# Patient Record
Sex: MALE | Race: WHITE | NOT HISPANIC OR LATINO
[De-identification: names, ages, dates, MRNs, and addresses within clinical notes are randomized per-mention and may not be internally consistent; named-entity substitution may affect disease eponyms.]

---

## 2019-04-30 PROBLEM — Z00.00 ENCOUNTER FOR PREVENTIVE HEALTH EXAMINATION: Status: ACTIVE | Noted: 2019-04-30

## 2019-05-06 ENCOUNTER — APPOINTMENT (OUTPATIENT)
Dept: OTOLARYNGOLOGY | Facility: CLINIC | Age: 24
End: 2019-05-06
Payer: MEDICAID

## 2019-05-06 VITALS
OXYGEN SATURATION: 96 % | WEIGHT: 145 LBS | SYSTOLIC BLOOD PRESSURE: 140 MMHG | BODY MASS INDEX: 22.76 KG/M2 | HEIGHT: 67 IN | TEMPERATURE: 97.8 F | HEART RATE: 60 BPM | DIASTOLIC BLOOD PRESSURE: 86 MMHG

## 2019-05-06 DIAGNOSIS — Z78.9 OTHER SPECIFIED HEALTH STATUS: ICD-10-CM

## 2019-05-06 PROCEDURE — 99203 OFFICE O/P NEW LOW 30 MIN: CPT | Mod: 25

## 2019-05-06 PROCEDURE — 31575 DIAGNOSTIC LARYNGOSCOPY: CPT

## 2019-05-10 PROBLEM — Z78.9 NO HISTORY OF ALCOHOL USE: Status: ACTIVE | Noted: 2019-05-06

## 2019-05-10 PROBLEM — Z78.9 NEVER USED TOBACCO: Status: ACTIVE | Noted: 2019-05-06

## 2019-05-16 NOTE — HISTORY OF PRESENT ILLNESS
[de-identified] : 23M nonsmoker, nondrinker, presents today with concern of subjective hypernasal voice and constant nasal drip and congestion.  He had surgery for deviated septum in 7/2017 for cosmetic purposes which required revision surgery in 3/2018 at [facility].  He feels that his breathing improved but voice has worsened since surgery.  He reports tightness in the cheek muscles.  He was told he needed jaw surgery to improve his mouth aperture.  He is able to fit three fingers in his mouth but he says it feels unnatural.  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

## 2019-05-16 NOTE — PHYSICAL EXAM
[Laryngoscopy Performed] : laryngoscopy was performed, see procedure section for findings [Normal] : orientation to person, place, and time: normal [FreeTextEntry1] : Pt able to open mouth three finger-widths, tight masseter muscles, no crepitations with TMJ movement.

## 2019-05-16 NOTE — ASSESSMENT
[FreeTextEntry1] : 23M with complaint of subjective hypernasal voice, not evidenced during his voice evaluation by SLP today.  Pt does have difficulty projecting his voice because opening his mouth wide feels unnatural.\par \par Plan:\par - Voice exercises and therapy per SLP recommendations.\par - F/u orthodontist.\par - F/U prn.

## 2019-05-16 NOTE — ADDENDUM
[FreeTextEntry1] : Speech Pathology:\par \par Pt seen today in conjunction with Dr. Lovelace [facility].\par \par Patient presents today with c/o increased hypernasality following surgery for deviated nasal septum in Jul 2017 with revision in Mar 2018 at [facility].  He denied nasal regurgitation of po.  He also denied dysphagia or odynophagia.\par \par On exam today, oral motor exam was unremarkable, although pt reported that it is an effort to open his mouth widely.  He is seeing orthodontist for possible mandibular advancement surgery.  Speech screening showed articulation and voice showed a lateral lisp, which pt had since childhood, and reduced mouth opening during speech.  He was able to increase mouth opening when cued to do same.  No hypernasal quality noted.  Mirror testing while producing pressure consonants did not show any fogging, which could have indicated velopharyngeal insufficiency if positive.  Pt reassured of these findings.  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 No further recs provided.\par \par Esteban Flowers MS, CCC-SLP, BCS-S\par Board-Certified Specialist in Swallowing Disorders\par Senior Speech Pathologist\par

## 2019-05-16 NOTE — PROCEDURE
[Image(s) Captured] : image(s) captured and filed [None] : none [Unable to Cooperate with Mirror] : patient unable to cooperate with mirror [Flexible Endoscope] : examined with the flexible endoscope [de-identified] : Flexible fiberoptic laryngoscope advanced orally and nasally, no oropharyngeal lesions or masses identified, good palatal retraction, no oral fistulas, nasal septum is midline, no nasal perforation, mucus present in nasal cavity,  larynx visualized, adequate and symmetric cord adduction and abduction noted, no laryngeal pathology noted.\par  [de-identified] : voice change

## 2020-02-21 ENCOUNTER — APPOINTMENT (OUTPATIENT)
Dept: OTOLARYNGOLOGY | Facility: CLINIC | Age: 25
End: 2020-02-21
Payer: SELF-PAY

## 2020-02-21 VITALS
HEIGHT: 67 IN | SYSTOLIC BLOOD PRESSURE: 125 MMHG | WEIGHT: 145 LBS | BODY MASS INDEX: 22.76 KG/M2 | DIASTOLIC BLOOD PRESSURE: 76 MMHG | HEART RATE: 8 BPM

## 2020-02-21 DIAGNOSIS — Z78.9 OTHER SPECIFIED HEALTH STATUS: ICD-10-CM

## 2020-02-21 PROCEDURE — D0165 COSMETIC CONSULT: CPT

## 2020-02-28 ENCOUNTER — APPOINTMENT (OUTPATIENT)
Dept: OTOLARYNGOLOGY | Facility: CLINIC | Age: 25
End: 2020-02-28
Payer: COMMERCIAL

## 2020-02-28 PROCEDURE — 31579 LARYNGOSCOPY TELESCOPIC: CPT

## 2020-02-28 PROCEDURE — 92524 BEHAVRAL QUALIT ANALYS VOICE: CPT | Mod: GN

## 2020-03-09 NOTE — ASSESSMENT
[FreeTextEntry1] : It was my impression that he is concerned about his voice. It sounds like his problems or more with NaSal a deep pitch and I discussed the difference with him. I suggested a speech evaluation to further elucidate and would consider further evaluation of his hyponasality and treatment depending on the results rather than thyroplasty

## 2020-03-09 NOTE — PHYSICAL EXAM
[FreeTextEntry1] : \par The patient was alert and oriented and in no distress.\par Voice was clear.\par He has a moderate lisp and his voice is soft but probably not abnormally pitched\par \par Face:\par The patient had no facial asymmetry or mass.\par The skin was unremarkable.\par \par Eyes:\par The pupils were equal round and reactive to light and accommodation.\par There was no significant nystagmus or disconjugate gaze noted.\par \par Nose: \par The external nose had no significant deformity. \par The nasal mucosa is boggy with a left septal deflection status post previous rhinoplasty\par \par \par Oral cavity:\par The oral mucosa was normal.\par The oral and base of tongue were clear and without mass.\par The gingival and buccal mucosa were moist and without lesions.\par The palate moved well.\par There was no cleft to the palate.\par There appeared to be good salivary flow.  \par There was no pus, erythema or mass in the oral cavity.\par \par \par Ears:\par The external ears were normal without deformity.\par The ear canals were clear.\par The tympanic membranes were intact and normal.\par \par Neck: \par The neck was symmetrical.\par The parotid and submandibular glands were normal without masses.\par The trachea was midline and there was no unusual crepitus.\par The thyroid was smooth and nontender and no masses were palpated.\par There was no significant cervical adenopathy.\par \par \par Neuro:\par Neurologically, the patient was awake, alert, and oriented to person, place and time. There were no obvious focal neurologic abnormalities.  Cranial nerves II through XII were grossly intact.\par \par \par TMJ:\par The temporomandibular joints were nontender.\par There was no abnormal crepitus and no significant malocclusion\par \par  [de-identified] : Nasal endoscopy showed that the mucosa was boggy with a persistent left septal deflection.\par \par Flexible fiberoptic laryngoscopy: CPT 77372\par Indications: Dysphagia\par Procedure note:\par \par Flexible fiberoptic laryngoscopy was performed because of dysphagia and because of the patient's inability to tolerate adequate mirror examination.\par \par The nasal cavity was anesthetized with Pontocaine and Afrin.\par The flexible endoscope was placed into the patient's nasal cavity.\par The nasopharynx was without masses.\par The oropharynx, vallecula and base of tongue had no masses.\par The epiglottis, aryepiglottic folds and false vocal cords were normal.\par The pyriform sinuses were without mucosal lesions or pooling of secretions.  \par The laryngeal ventricles were without lesions.\par The visualized subglottis was without mass.\par The lateral and posterior pharyngeal walls were clear and symmetrical.\par The vocal folds were clear and mobile; they abducted and adducted normally.\par There was no interarytenoid mass or fullness.\par There was a slight glottic gap on phonation

## 2020-03-09 NOTE — ADDENDUM
[FreeTextEntry1] : 3/9/20  speech eval appreciated.  Normal fundamental frequencies, small anterior gap \par RLP

## 2020-03-09 NOTE — CONSULT LETTER
[FreeTextEntry2] : ASHKAN ANDERSEN\par  [FreeTextEntry1] : \par \par Dear  Dr. ASHKAN ANDERSEN,\par \par I had the pleasure of seeing your patient today.  \par Please see my note below.\par \par \par Thank you very much for allowing me to participate in the care of your patient.\par \par Sincerely,\par \par \par Tolu Bledsoe MD\par [facility]\par [facility]\par  [facility]\par \par

## 2020-03-09 NOTE — HISTORY OF PRESENT ILLNESS
[de-identified] : JUHI ROSALES For evaluation of his voice. He finds that he cannot project as loudly as she would like and feels his voice is higher pitched then he would like. 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 He has not had other problems with his voice and denies significant reflux. He is a nonsmoker. He had speech therapy in high school for a list but not for his voice.The patient had no other ear nose or throat complaints at this visit.

## 2020-03-10 ENCOUNTER — APPOINTMENT (OUTPATIENT)
Dept: OTOLARYNGOLOGY | Facility: CLINIC | Age: 25
End: 2020-03-10
Payer: COMMERCIAL

## 2020-03-10 VITALS
WEIGHT: 145 LBS | HEART RATE: 90 BPM | SYSTOLIC BLOOD PRESSURE: 157 MMHG | DIASTOLIC BLOOD PRESSURE: 87 MMHG | BODY MASS INDEX: 22.76 KG/M2 | HEIGHT: 67 IN

## 2020-03-10 PROCEDURE — 99213 OFFICE O/P EST LOW 20 MIN: CPT

## 2020-06-10 ENCOUNTER — APPOINTMENT (OUTPATIENT)
Dept: OTOLARYNGOLOGY | Facility: CLINIC | Age: 25
End: 2020-06-10

## 2020-07-16 NOTE — PHYSICAL EXAM
[FreeTextEntry1] : \par The patient was alert and oriented and in no distress.\par Voice was clear.\par \par Face:\par The patient had no facial asymmetry or mass.\par The skin was unremarkable.\par \par Eyes:\par The pupils were equal round and reactive to light and accommodation.\par There was no significant nystagmus or disconjugate gaze noted.\par \par Nose: \par The external nose had no significant deformity.  There was no facial tenderness.  On anterior rhinoscopy, the nasal mucosa was clear.  The anterior septum was midline.  There were no visualized polyps purulence  or masses.\par \par Oral cavity:\par The oral mucosa was normal.\par The oral and base of tongue were clear and without mass.\par The gingival and buccal mucosa were moist and without lesions.\par The palate moved well.\par There was no cleft to the palate.\par There appeared to be good salivary flow.  \par There was no pus, erythema or mass in the oral cavity.\par \par \par Ears:\par The external ears were normal without deformity.\par The ear canals were clear.\par The tympanic membranes were intact and normal.\par \par Neck: \par The neck was symmetrical.\par The parotid and submandibular glands were normal without masses.\par The trachea was midline and there was no unusual crepitus.\par The thyroid was smooth and nontender and no masses were palpated.\par There was no significant cervical adenopathy.\par \par \par Neuro:\par Neurologically, the patient was awake, alert, and oriented to person, place and time. There were no obvious focal neurologic abnormalities.  Cranial nerves II through XII were grossly intact.\par \par \par TMJ:\par The temporomandibular joints were nontender.\par There was no abnormal crepitus and no significant malocclusion\par \par

## 2020-07-16 NOTE — ASSESSMENT
[FreeTextEntry1] : It was my impression that his problem is not is fundamental frequency but the small glottic gap.He had therapy in the past for her a list but I recommend going to speech therapy for this. If he does not respond I would consider vocal fold injection.

## 2020-07-16 NOTE — HISTORY OF PRESENT ILLNESS
[de-identified] : JUHI ROSALES Was seen in followup on March 10. Since I had seen him, he had a repeat stroboscopy and speech analysis.\par This showed that he has had small anterior gap, primarily on the left and normal fundamental frequencies for speech although high which allowed speaking.

## 2020-07-16 NOTE — ADDENDUM
[FreeTextEntry1] : speech evaluation appreciated.  Normal ff-  with small glottic gap.  Suggest speech therapy, consider vocal fold augmentation rather thana problem with pitch.    RLP

## 2020-07-22 ENCOUNTER — APPOINTMENT (OUTPATIENT)
Dept: OTOLARYNGOLOGY | Facility: CLINIC | Age: 25
End: 2020-07-22
Payer: COMMERCIAL

## 2020-07-22 VITALS — BODY MASS INDEX: 22.76 KG/M2 | WEIGHT: 145 LBS | HEIGHT: 67 IN | TEMPERATURE: 97.6 F

## 2020-07-22 PROCEDURE — 99214 OFFICE O/P EST MOD 30 MIN: CPT

## 2020-07-22 NOTE — HISTORY OF PRESENT ILLNESS
[de-identified] : JUHI ROSALES Was seen in followup on July 22nd.  He had a repeat stroboscopy and speech analysis.\par This showed that he has had small anterior gap, primarily on the left and normal fundamental frequencies for speech although high when  speaking loudly.

## 2020-07-22 NOTE — ASSESSMENT
[FreeTextEntry1] : He had been told by the speech pathologist that speech therapy is not covered by his insurance. I reviewed this with him. I don't believe he would be a candidate for thyroplasty and I'm not sure that he would benefit from bulking the left true vocal fold.\par I showed him how he should speak of when he is trying to project.\par This was reviewed at length with him but I still felt she should find a speech therapist for this and his lisp and I will review the findings with him or her\par I would like to see him back after speech therapy\par \par More than 25 minutes was spent with the patient reviewing options, medical records and counseling about care, face to face.

## 2020-09-10 ENCOUNTER — APPOINTMENT (OUTPATIENT)
Dept: OTOLARYNGOLOGY | Facility: CLINIC | Age: 25
End: 2020-09-10
Payer: COMMERCIAL

## 2020-09-10 PROCEDURE — 92507 TX SP LANG VOICE COMM INDIV: CPT | Mod: GN

## 2020-09-15 ENCOUNTER — APPOINTMENT (OUTPATIENT)
Dept: OTOLARYNGOLOGY | Facility: CLINIC | Age: 25
End: 2020-09-15
Payer: COMMERCIAL

## 2020-09-15 ENCOUNTER — APPOINTMENT (OUTPATIENT)
Dept: OTOLARYNGOLOGY | Facility: CLINIC | Age: 25
End: 2020-09-15

## 2020-09-15 PROCEDURE — 92507 TX SP LANG VOICE COMM INDIV: CPT | Mod: GN

## 2020-09-22 ENCOUNTER — APPOINTMENT (OUTPATIENT)
Dept: OTOLARYNGOLOGY | Facility: CLINIC | Age: 25
End: 2020-09-22
Payer: COMMERCIAL

## 2020-09-22 PROCEDURE — 92507 TX SP LANG VOICE COMM INDIV: CPT | Mod: GN

## 2020-10-06 ENCOUNTER — APPOINTMENT (OUTPATIENT)
Dept: OTOLARYNGOLOGY | Facility: CLINIC | Age: 25
End: 2020-10-06
Payer: COMMERCIAL

## 2020-10-06 PROCEDURE — 92507 TX SP LANG VOICE COMM INDIV: CPT | Mod: GN

## 2020-10-12 ENCOUNTER — OUTPATIENT (OUTPATIENT)
Dept: OUTPATIENT SERVICES | Facility: HOSPITAL | Age: 25
LOS: 1 days | End: 2020-10-12

## 2020-10-12 ENCOUNTER — APPOINTMENT (OUTPATIENT)
Dept: PLASTIC SURGERY | Facility: CLINIC | Age: 25
End: 2020-10-12

## 2020-10-12 VITALS
RESPIRATION RATE: 14 BRPM | OXYGEN SATURATION: 100 % | HEART RATE: 71 BPM | TEMPERATURE: 98 F | BODY MASS INDEX: 23.23 KG/M2 | DIASTOLIC BLOOD PRESSURE: 87 MMHG | WEIGHT: 148 LBS | SYSTOLIC BLOOD PRESSURE: 122 MMHG | HEIGHT: 67 IN

## 2020-10-13 ENCOUNTER — TRANSCRIPTION ENCOUNTER (OUTPATIENT)
Age: 25
End: 2020-10-13

## 2020-10-13 ENCOUNTER — APPOINTMENT (OUTPATIENT)
Dept: OTOLARYNGOLOGY | Facility: CLINIC | Age: 25
End: 2020-10-13
Payer: COMMERCIAL

## 2020-10-13 PROCEDURE — 92507 TX SP LANG VOICE COMM INDIV: CPT | Mod: GN

## 2020-10-14 DIAGNOSIS — Z01.89 ENCOUNTER FOR OTHER SPECIFIED SPECIAL EXAMINATIONS: ICD-10-CM

## 2020-10-20 ENCOUNTER — APPOINTMENT (OUTPATIENT)
Dept: OTOLARYNGOLOGY | Facility: CLINIC | Age: 25
End: 2020-10-20
Payer: COMMERCIAL

## 2020-10-20 PROCEDURE — 92507 TX SP LANG VOICE COMM INDIV: CPT | Mod: GN

## 2020-11-12 ENCOUNTER — APPOINTMENT (OUTPATIENT)
Dept: OTOLARYNGOLOGY | Facility: CLINIC | Age: 25
End: 2020-11-12
Payer: COMMERCIAL

## 2020-11-12 PROCEDURE — 92507 TX SP LANG VOICE COMM INDIV: CPT | Mod: GN

## 2020-11-12 PROCEDURE — 99072 ADDL SUPL MATRL&STAF TM PHE: CPT

## 2020-11-17 ENCOUNTER — APPOINTMENT (OUTPATIENT)
Dept: OTOLARYNGOLOGY | Facility: CLINIC | Age: 25
End: 2020-11-17
Payer: COMMERCIAL

## 2020-11-17 PROCEDURE — 92507 TX SP LANG VOICE COMM INDIV: CPT | Mod: GN

## 2020-11-24 ENCOUNTER — APPOINTMENT (OUTPATIENT)
Dept: OTOLARYNGOLOGY | Facility: CLINIC | Age: 25
End: 2020-11-24
Payer: COMMERCIAL

## 2020-11-24 PROCEDURE — 92507 TX SP LANG VOICE COMM INDIV: CPT | Mod: GN

## 2020-12-01 ENCOUNTER — APPOINTMENT (OUTPATIENT)
Dept: OTOLARYNGOLOGY | Facility: CLINIC | Age: 25
End: 2020-12-01
Payer: COMMERCIAL

## 2020-12-01 PROCEDURE — 99072 ADDL SUPL MATRL&STAF TM PHE: CPT

## 2020-12-01 PROCEDURE — 92507 TX SP LANG VOICE COMM INDIV: CPT | Mod: GN

## 2020-12-07 ENCOUNTER — APPOINTMENT (OUTPATIENT)
Dept: OTOLARYNGOLOGY | Facility: CLINIC | Age: 25
End: 2020-12-07
Payer: COMMERCIAL

## 2020-12-07 VITALS — TEMPERATURE: 97.1 F | HEIGHT: 67 IN | WEIGHT: 148 LBS | BODY MASS INDEX: 23.23 KG/M2

## 2020-12-07 PROCEDURE — 99213 OFFICE O/P EST LOW 20 MIN: CPT

## 2020-12-07 PROCEDURE — 99072 ADDL SUPL MATRL&STAF TM PHE: CPT

## 2020-12-07 NOTE — HISTORY OF PRESENT ILLNESS
[de-identified] : JUHI ROSALES Was seen in followup on December 7. He had a voice evaluation and therapy and still was unhappy with his voice. 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  His voice evaluation included a normal fundamental frequency with sustained phonation at 124 Hz and bleeding and passage his fundamental frequency was 117.68 Hz also within normal limits. His fundamental frequency, with loud phonation however was 160 Hz \par The patient had no other ear nose or throat complaints at this visit.\par

## 2020-12-07 NOTE — REASON FOR VISIT
[Subsequent Evaluation] : a subsequent evaluation for [FreeTextEntry2] : follow up after speech therapy

## 2020-12-07 NOTE — PHYSICAL EXAM
[FreeTextEntry1] : \par The patient was alert and oriented and in no distress.\par  \par \par Face:\par The patient had no facial asymmetry or mass.\par The skin was unremarkable.\par \par Eyes:\par The pupils were equal round and reactive to light and accommodation.\par There was no significant nystagmus or disconjugate gaze noted.\par \par Nose: \par The external nose had no significant deformity.  There was no facial tenderness.  On anterior rhinoscopy, the nasal mucosa was clear.  The anterior septum was midline.  There were no visualized polyps purulence  or masses.\par \par Oral cavity:\par The oral mucosa was normal.\par The oral and base of tongue were clear and without mass.\par The gingival and buccal mucosa were moist and without lesions.\par The palate moved well.\par There was no cleft to the palate.\par There appeared to be good salivary flow.  \par There was no pus, erythema or mass in the oral cavity.\par \par \par Ears:\par The external ears were normal without deformity.\par The ear canals were clear.\par The tympanic membranes were intact and normal.\par \par Neck: \par The neck was symmetrical.\par The parotid and submandibular glands were normal without masses.\par The trachea was midline and there was no unusual crepitus.\par The thyroid was smooth and nontender and no masses were palpated.\par There was no significant cervical adenopathy.\par \par \par Neuro:\par Neurologically, the patient was awake, alert, and oriented to person, place and time. There were no obvious focal neurologic abnormalities.  Cranial nerves II through XII were grossly intact.\par \par \par TMJ:\par The temporomandibular joints were nontender.\par There was no abnormal crepitus and no significant malocclusion\par \par \par Voice:\par He has a lateral lisp and his speaking frequency was not obviously elevated.  He clearly is under using his vocal support. He did not have evidence of hypernasality.  \par \par Flexible fiberoptic laryngoscopy: [facility] [id number]\par Indications: Dysphagia\par Procedure note:\par \par Flexible fiberoptic laryngoscopy was performed because of dysphagia and because of the patient's inability to tolerate adequate mirror examination.\par \par The nasal cavity was anesthetized with Pontocaine and Afrin.\par The flexible endoscope was placed into the patient's nasal cavity.\par The nasopharynx was without masses.\par The oropharynx, vallecula and base of tongue had no masses.\par The epiglottis, aryepiglottic folds and false vocal cords were normal.\par The pyriform sinuses were without mucosal lesions or pooling of secretions.  \par The laryngeal ventricles were without lesions.\par The visualized subglottis was without mass.\par The lateral and posterior pharyngeal walls were clear and symmetrical.\par The vocal folds were clear and mobile; they abducted and adducted normally.\par There was no interarytenoid mass or fullness.\par There was mild posterior pharyngeal inflammation and tendency to a small gap with good closure with forced phonation.\par \par Endoscopy was done with Covid precautions and with video. All risks and benefits were discussed with the patient and consent obtained.\par

## 2020-12-07 NOTE — ASSESSMENT
[FreeTextEntry1] : It was my impression that his issue is more of resonance were then vocal frequency. He does improve her speech therapy but finds it difficult to use his learned changes in day-to-day life.\par I explained that thyroplasty would lower his resting speaking fundamental frequencies, but am not sure that would necessarily be the best option for him as it would likely decrease his vocal range, make more of a vocal jenkins and not improve his resonance.\par It certainly could be done if he desires, but I suggested further voice evaluation perhaps also to discuss adding volume to his vocal cords.\par \par I would like to see him back after.

## 2020-12-08 ENCOUNTER — APPOINTMENT (OUTPATIENT)
Dept: OTOLARYNGOLOGY | Facility: CLINIC | Age: 25
End: 2020-12-08
Payer: COMMERCIAL

## 2020-12-08 PROCEDURE — 99072 ADDL SUPL MATRL&STAF TM PHE: CPT

## 2020-12-08 PROCEDURE — 92507 TX SP LANG VOICE COMM INDIV: CPT | Mod: GN

## 2020-12-11 ENCOUNTER — APPOINTMENT (OUTPATIENT)
Dept: OTOLARYNGOLOGY | Facility: CLINIC | Age: 25
End: 2020-12-11
Payer: COMMERCIAL

## 2020-12-11 VITALS
WEIGHT: 145 LBS | HEIGHT: 67 IN | OXYGEN SATURATION: 98 % | TEMPERATURE: 97.5 F | BODY MASS INDEX: 22.76 KG/M2 | HEART RATE: 100 BPM

## 2020-12-11 PROCEDURE — 99214 OFFICE O/P EST MOD 30 MIN: CPT | Mod: 25

## 2020-12-11 PROCEDURE — 31579 LARYNGOSCOPY TELESCOPIC: CPT

## 2020-12-11 PROCEDURE — 31570 LARYNGOSCOPE W/VC INJ: CPT

## 2020-12-11 PROCEDURE — 99072 ADDL SUPL MATRL&STAF TM PHE: CPT

## 2020-12-15 ENCOUNTER — APPOINTMENT (OUTPATIENT)
Dept: OTOLARYNGOLOGY | Facility: CLINIC | Age: 25
End: 2020-12-15

## 2020-12-15 ENCOUNTER — APPOINTMENT (OUTPATIENT)
Age: 25
End: 2020-12-15
Payer: COMMERCIAL

## 2020-12-15 ENCOUNTER — APPOINTMENT (OUTPATIENT)
Dept: OTOLARYNGOLOGY | Facility: CLINIC | Age: 25
End: 2020-12-15
Payer: COMMERCIAL

## 2020-12-15 VITALS
HEART RATE: 74 BPM | BODY MASS INDEX: 22.76 KG/M2 | WEIGHT: 145 LBS | TEMPERATURE: 97.3 F | OXYGEN SATURATION: 98 % | SYSTOLIC BLOOD PRESSURE: 126 MMHG | DIASTOLIC BLOOD PRESSURE: 79 MMHG | HEIGHT: 67 IN

## 2020-12-15 PROCEDURE — 99072 ADDL SUPL MATRL&STAF TM PHE: CPT

## 2020-12-15 PROCEDURE — 31574Z: CUSTOM | Mod: 50

## 2020-12-15 PROCEDURE — 92507 TX SP LANG VOICE COMM INDIV: CPT | Mod: GN

## 2020-12-15 NOTE — ASSESSMENT
[FreeTextEntry1] : 25M who presents for second opinion for change in voice quality. On exam, patient has dysphonia with decreased projection, but he reports subjective airiness and high pitch. 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 We discussed the risks, benefits and alternative to therapeutic saline injection in the office today to get a sense of what vocal fold augmentation could produce for his voice quality.  He tolerated this procedure well and had improvement in breathing, breath support and voice.  He was happy with the result and agrees to plan for formal injection augmentation in the near future.\par \par Plan:\par - plan for in office vocal fold augmentation\par - fu next week for procedure\par - cont SpP

## 2020-12-15 NOTE — PROCEDURE
[FreeTextEntry3] : PREOPERATIVE DIAGNOSIS: dysphonia, glottic insufficiency\par \par POSTOPERATIVE DIAGNOSIS: Same as above\par \par NAME OF PROCEDURE: Bilateral vocal fold injection medialization - 45367\par \par SURGEON: Antony Cheng MD\par \par ASSISTANTS: Laxmi Monae\par \par ANESTHESIA: Topical Lidocaine\par \par ESTIMATED BLOOD LOSS: < 1 cc\par \par SPECIMENS: None\par \par COMPLICATIONS: None\par \par INDICATIONS FOR SURGERY: \par This is a 25-year-old with dysphonia found to have a glottic insufficiency on examination. The decision was made to proceed with a vocal fold injection medialization. The patient understands the risks, benefits, and alternatives of the surgery including possibility of worsened voice, pain, discomfort, bleeding, need for further surgery, difficulty swallowing, and wished to go ahead with the operation as planned. I spoke with the patient and all of their questions were answered to their satisfaction and they asked me to perform this procedure.\par \par DETAILS OF THE PROCEDURE: The patient was brought to the procedure room and I identified them. A formal timeout was called.\par \par Using topical anesthesia including a mixture of 2% lidocaine with afrin, I anesthetized the nasal cavity, oral cavity, oropharynx and hypopharynx topically. Using the flexible nasopharyngolaryngoscope I was able to have an enlarged view of the endolarynx on a monitor. Under indirect microscopic visualization further anesthetized the endolarynx, epiglottis, arytenoids and vocal folds with 2 cc of topical 4% lidocaine.\par \par Once properly anesthetized, visualization of the larynx was achieved transnasally. I then introduced the vocal fold injector transorally. Under indirect visualization the injector was placed in the Right paraglottic space of the affecting vocal fold. 1.0 cc of hyaluronic acid was injected until the vocal fold was medialized. The injector was then withdrawn. \par \par I then introduced the vocal fold injector transorally. Under indirect visualization the injector was placed in the Left paraglottic space of the affecting vocal fold. 1.0 cc of hyaluronic acid was injected until the vocal fold was medialized. The injector was then withdrawn. \par \par On nasopharyngolaryngoscopy there was evidence of glottic closure. The patient tolerated the procedure. We allowed for several minutes of observation and voice use. The patient noted significant improvement in his voice.\par \par \par \par Juvederm Ultra\par GTIN [id number]\par Lot # [id number]\par Exp Date 6/7/2022\par 2 boxes for a total of 2cc used.

## 2020-12-15 NOTE — PROCEDURE
[de-identified] : Flexible Laryngoscopy with Stroboscopy \par Procedure Note\par  \par Pre-operative Diagnosis: dysphonia\par Post-operative Diagnosis: glottic insufficiency, muscle tension dysphonia\par Anesthesia: Topical - 1 % Lidocaine/Phenylephrine\par Procedure: Flexible Laryngoscopy with Stroboscopy\par Procedure Details: \par The patient was placed in the sitting position. After decongestant and anesthesia were applied the laryngoscope was passed. The nasal cavities, nasopharynx, oropharynx, hypopharynx, and larynx were all examined. Vocal folds were examined during respiration and phonation. The following findings were noted:\par  \par Findings: \par Nose: Septum is midline, turbinates are normal, nasal airways patent, mucosa normal\par Nasopharynx: Adenoids normal, no masses, eustachian tube normal\par Oropharynx: Pharyngeal walls symmetric and without lesion. Tonsils/fossae symmetric\par Hypopharynx: Hypopharynx and pyriform sinuses without lesion. No masses or asymmetry. No pooling of secretions.\par Larynx: Epiglottis and aryepiglottic folds were sharp and crisp bilaterally. Bilateral false vocal folds normal appearance. Airway was widely patent.\par  \par Strobe Exam Ratings\par TVF Appearance: normal, mild bowing\par TVF Mobility: normal\par Edema/hypertrophy: none\par Mucus on TVF: scant\par Glottic Closure: + glottic gap\par Mucosal Wave: normal\par Amplitude of Vibration: normal\par Phase: symmetric\par Supraglottic Hyperfunction: + supraglottic compression\par Other Findings: none\par  \par Condition: Stable. Patient tolerated procedure well.\par  \par Complications: None\par -------------------------------------------------------------------\par \par PREOPERATIVE DIAGNOSIS: dysphonia, glottic insufficiency\par \par POSTOPERATIVE DIAGNOSIS: Same as above\par \par NAME OF PROCEDURE: Therapeutic vocal fold injection \par \par SURGEON: Antony Cheng MD\par \par ASSISTANTS: ABHI Evangelista\par \par ANESTHESIA: Topical Lidocaine\par \par ESTIMATED BLOOD LOSS: < 1 cc\par \par SPECIMENS: None\par \par COMPLICATIONS: None\par \par INDICATIONS FOR SURGERY: \par This is a 25-year-old with dysphonia found to have a glottic insufficency on examination. The decision was made to proceed with a vocal fold injection medialization. The patient understands the risks, benefits, and alternatives of the surgery including possibility of worsened voice, pain, discomfort, bleeding, need for further surgery, difficulty swallowing, and wished to go ahead with the operation as planned. I spoke with the patient and all of their questions were answered to their satisfaction and they asked me to perform this procedure.\par \par DETAILS OF THE PROCEDURE: The patient was brought to the procedure room and I identified them. A formal timeout was called.\par \par Using topical anesthesia including a mixture of 2% lidocaine with afrin, I anesthetized the nasal cavity, oral cavity, oropharynx and hypopharynx topically. Using the flexible nasopharyngolaryngoscope I was able to have an enlarged view of the endolarynx on a monitor. Under indirect microscopic visualization further anesthetized the endolarynx, epiglottis, arytenoids and vocal folds with 2 cc of topical 4% lidocaine.\par \par Once properly anesthetized, visualization of the larynx was achieved transnasally. I then introduced the vocal fold injector transorally. Under indirect visualization the injector was placed in the paraglottic space on the right of the affecting vocal fold. 1.0 cc of sterile saline was injected until the vocal fold was medialized. The injector was then withdrawn. \par \par  I then introduced the vocal fold injector transorally. Under indirect visualization the injector was placed in the paraglottic space on the left of the affecting vocal fold. 1.0 cc of sterile saline was injected until the vocal fold was medialized. The injector was then withdrawn. \par \par On nasopharyngolaryngoscopy there was evidence of glottic closure. The patient tolerated the procedure. We allowed for several minutes of observation and voice use. The patient noted significant improvement in his voice.\par \par \par

## 2020-12-15 NOTE — HISTORY OF PRESENT ILLNESS
[de-identified] : 25M who presents for second opinion of options for dysphonia. Patient reports since high school, he has noticed he has a very high pitched and "breathiness" to his voice that he does not like and feels does not sound professional.  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 He has done speech therapy when he was younger for pronunciation and more recently for voice. He denies any SOB, sore throat, odynophagia, dysphagia or hoarseness.  \par \par Of note he is also have a jaw surgery to help with his bite and aperture of is mouth.\par \par No other ENT complaints.

## 2020-12-15 NOTE — REVIEW OF SYSTEMS
[Patient Intake Form Reviewed] : Patient intake form was reviewed [As Noted in HPI] : as noted in HPI [FreeTextEntry1] : all other ROS negative

## 2020-12-15 NOTE — PHYSICAL EXAM
[Normal] : mucosa is normal [Midline] : trachea located in midline position [Laryngoscopy Performed] : laryngoscopy was performed, see procedure section for findings [FreeTextEntry1] : good pitch control, breathy quality, decreased projection.

## 2020-12-18 ENCOUNTER — APPOINTMENT (OUTPATIENT)
Dept: OTOLARYNGOLOGY | Facility: CLINIC | Age: 25
End: 2020-12-18
Payer: COMMERCIAL

## 2020-12-18 VITALS — SYSTOLIC BLOOD PRESSURE: 120 MMHG | OXYGEN SATURATION: 98 % | DIASTOLIC BLOOD PRESSURE: 81 MMHG | TEMPERATURE: 97.5 F

## 2020-12-18 PROCEDURE — 99214 OFFICE O/P EST MOD 30 MIN: CPT | Mod: 25

## 2020-12-18 PROCEDURE — 99072 ADDL SUPL MATRL&STAF TM PHE: CPT

## 2020-12-18 PROCEDURE — 31579 LARYNGOSCOPY TELESCOPIC: CPT

## 2020-12-18 NOTE — PHYSICAL EXAM
[Normal] : mucosa is normal [Midline] : trachea located in midline position [Laryngoscopy Performed] : laryngoscopy was performed, see procedure section for findings [FreeTextEntry1] : good voice clarity and pitch control .  hoarse at end of sentences with less breath support

## 2020-12-18 NOTE — HISTORY OF PRESENT ILLNESS
[de-identified] :  25M who presents for second opinion of options for dysphonia. Patient reports since high school, he has noticed he has a very high pitched and "breathiness" to his voice that he does not like and feels does not sound professional. 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 He has done speech therapy when he was younger for pronunciation and more recently for voice. He denies any SOB, sore throat, odynophagia, dysphagia or hoarseness. \par \par Of note he is also have a jaw surgery to help with his bite and aperture of is mouth.\par \par No other ENT complaints. \par - [FreeTextEntry1] : Update 12/18/20\par Patient underwent vocal fold medialization on 12/15/20. The following day patient was using his voice teaching class when he developed hoarseness. This lasted several days which has since, he explains, significantly improved. He explains that after the hoarseness resolved he feels his voice has returned back to his baseline. He denies any SOB, sore throat, odynophagia, dysphagia.

## 2020-12-18 NOTE — PROCEDURE
[de-identified] : Flexible Laryngoscopy with Stroboscopy \par Procedure Note\par  \par Pre-operative Diagnosis: hoarseness s/p vocal fold augmentation\par Post-operative Diagnosis: bilaterally augmented vocal folds, healthy appeared with adequate glottic closure.\par Anesthesia: Topical - 1 % Lidocaine/Phenylephrine\par Procedure: Flexible Laryngoscopy with Stroboscopy\par Procedure Details: \par The patient was placed in the sitting position. After decongestant and anesthesia were applied the laryngoscope was passed. The nasal cavities, nasopharynx, oropharynx, hypopharynx, and larynx were all examined. Vocal folds were examined during respiration and phonation. The following findings were noted:\par  \par Findings: \par Nose: Septum is midline, turbinates are normal, nasal airways patent, mucosa normal\par Nasopharynx: Adenoids normal, no masses, eustachian tube normal\par Oropharynx: Pharyngeal walls symmetric and without lesion. Tonsils/fossae symmetric\par Hypopharynx: Hypopharynx and pyriform sinuses without lesion. No masses or asymmetry. No pooling of secretions.\par Larynx: Epiglottis and aryepiglottic folds were sharp and crisp bilaterally. Bilateral false vocal folds normal appearance. Airway was widely patent.\par  \par Strobe Exam Ratings\par TVF Appearance: normal appearing, no bowing\par TVF Mobility: normal\par Edema/hypertrophy: none\par Mucus on TVF: scant\par Glottic Closure: no glottic gap\par Mucosal Wave: normal\par Amplitude of Vibration: normal\par Phase: symmetric\par Supraglottic Hyperfunction: improved supraglottic compression\par Other Findings: none\par  \par Condition: Stable. Patient tolerated procedure well.\par  \par Complications: None\par \par

## 2020-12-18 NOTE — ASSESSMENT
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 Pt feels that results dissipated after 1 days and presents for evaluation.  On exam there appears to be augmentation of the vocal folds bilaterally without glottic insufficiency.  Voice testing shows improvement in breath support and resonance.  At this point i am recommend observation, voice hygiene, increased hydration, cont SpP exercises.  Follow up in 4-5 weeks, sooner should symptoms worsen or fail to improve.  If patient is still not satisfied, will consider consultation with JANNY Berry.\par \par Plan:\par - voice hygiene\par - increased hydration\par - cont SLP exercises.\par - fu 4-5 weeks\par - consider referral to Maritza Mac if no improvement

## 2021-01-06 ENCOUNTER — APPOINTMENT (OUTPATIENT)
Dept: OTOLARYNGOLOGY | Facility: CLINIC | Age: 26
End: 2021-01-06
Payer: COMMERCIAL

## 2021-01-06 VITALS — HEIGHT: 67 IN | BODY MASS INDEX: 22.76 KG/M2 | WEIGHT: 145 LBS | TEMPERATURE: 97.9 F

## 2021-01-06 PROCEDURE — 31579 LARYNGOSCOPY TELESCOPIC: CPT

## 2021-01-06 PROCEDURE — 99072 ADDL SUPL MATRL&STAF TM PHE: CPT

## 2021-01-06 PROCEDURE — 99213 OFFICE O/P EST LOW 20 MIN: CPT | Mod: 25

## 2021-01-06 NOTE — ASSESSMENT
[FreeTextEntry1] : It was my impression that she had bilateral vocal fold augmentation and his exam looks improved and his voice, to me, seems more robust. He is concerned that it is still a high pitched voice especially when she phonates loudly. He had vocal function testing done right after the procedure and even know his voice sounded better, his speech was higher. He has undergone quite a bit of speech therapy. At this point, before I would recommend pitch lowering procedure, I wanted him to repeat his fundamental frequencies and voice analysis. 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

## 2021-01-06 NOTE — CONSULT LETTER
[FreeTextEntry2] : ASHKAN ANDERSEN\par  [FreeTextEntry1] : \par \par Dear  Dr. ASHKAN ANDERSEN,\par \par I had the pleasure of seeing your patient today.  \par Please see my note below.\par \par \par Thank you very much for allowing me to participate in the care of your patient.\par \par Sincerely,\par \par \par Tolu Bledsoe MD\par [facility]\par [facility]\par  [facility]\par \par

## 2021-01-06 NOTE — PHYSICAL EXAM
[de-identified] : Flexible fiberoptic laryngoscopy with video recording and stroboscopy: CPT 67102\par Indications: Dysphagia\par Procedure note:\par \par Flexible fiberoptic laryngoscopy was performed because of dysphagia and because of the patient's inability to tolerate adequate mirror examination.\par \par The nasal cavity was anesthetized with Pontocaine and Afrin.\par The flexible endoscope was placed into the patient's nasal cavity.\par The nasopharynx was without masses.\par The oropharynx, vallecula and base of tongue had no masses.\par The epiglottis, aryepiglottic folds and false vocal cords were normal.\par The pyriform sinuses were without mucosal lesions or pooling of secretions.  \par The laryngeal ventricles were without lesions.\par The visualized subglottis was without mass.\par The lateral and posterior pharyngeal walls were clear and symmetrical.\par The vocal folds were clear and mobile; they abducted and adducted normally.\par There was no interarytenoid mass or fullness.\par \par Endoscopy was done with Covid precautions and with video. All risks and benefits were discussed with the patient and consent obtained.\par \par He had normal vocal fold mobility with good glottic waves and no areas of hyper or hypokinesis.  His cords looked appropriately debulked at this time and improved.

## 2021-01-06 NOTE — HISTORY OF PRESENT ILLNESS
[de-identified] : JUHI ROSALES Was seen on January 6. He had bilateral vocal fold augmentations on December 15 and feels he had a brief, couple of hours improvement. He still feels that his speech is too high. His fundamental frequency was actually increased directly after the procedure although he was felt to have improved as both by the patient and the examiner. He still remains unhappy with his vocal quality and he did undergo speech therapy. He complains that his pitch is too high and trying to speak in the lower pitch is fatiguing. The patient had no other ear nose or throat complaints at this visit.

## 2021-01-08 ENCOUNTER — APPOINTMENT (OUTPATIENT)
Dept: OTOLARYNGOLOGY | Facility: CLINIC | Age: 26
End: 2021-01-08
Payer: COMMERCIAL

## 2021-01-08 VITALS
TEMPERATURE: 97.5 F | HEIGHT: 67 IN | DIASTOLIC BLOOD PRESSURE: 72 MMHG | WEIGHT: 145 LBS | OXYGEN SATURATION: 98 % | BODY MASS INDEX: 22.76 KG/M2 | SYSTOLIC BLOOD PRESSURE: 122 MMHG

## 2021-01-08 DIAGNOSIS — R49.0 DYSPHONIA: ICD-10-CM

## 2021-01-08 PROCEDURE — 99214 OFFICE O/P EST MOD 30 MIN: CPT | Mod: 25

## 2021-01-08 PROCEDURE — 92520 LARYNGEAL FUNCTION STUDIES: CPT | Mod: 59,52,GN

## 2021-01-08 PROCEDURE — 99072 ADDL SUPL MATRL&STAF TM PHE: CPT

## 2021-01-08 PROCEDURE — 31579 LARYNGOSCOPY TELESCOPIC: CPT

## 2021-01-08 PROCEDURE — 92524 BEHAVRAL QUALIT ANALYS VOICE: CPT | Mod: GN

## 2021-01-08 NOTE — HISTORY OF PRESENT ILLNESS
[de-identified] : 25M who presents for second opinion of options for dysphonia. Patient reports since high school, he has noticed he has a very high pitched and "breathiness" to his voice that he does not like and feels does not sound professional.  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 He has done speech therapy when he was younger for pronunciation and more recently for voice. He denies any SOB, sore throat, odynophagia, dysphagia or hoarseness.  \par \par Of note he is also have a jaw surgery to help with his bite and aperture of is mouth.\par \par No other ENT complaints. \par -\par Interval History: Update 12/18/20\par Patient underwent vocal fold medialization on 12/15/20. The following day patient was using his voice teaching class when he developed hoarseness. This lasted several days which has since, he explains, significantly improved. He explains that after the hoarseness resolved he feels his voice has returned back to his baseline. He denies any SOB, sore throat, odynophagia, dysphagia. \par - [FreeTextEntry1] : Interval History: Update 1/8/21\par Patient underwent vocal fold medialization on 12/15/20. At this point he is still unsatisfied with the pitch of his voice.  no issues chewing, eating or swallowing.  No breathing issues.  He did follow up with Dr. Bledsoe and is considering thyroidplasty 3b.

## 2021-01-08 NOTE — PROCEDURE
[de-identified] : Laryngoscopy: Flexible Laryngoscopy with Stroboscopy \par Procedure Note\par  \par Pre-operative Diagnosis: hoarseness s/p vocal fold augmentation\par Post-operative Diagnosis: bilaterally augmented vocal folds, healthy appeared with adequate glottic closure.\par Anesthesia: Topical - 1 % Lidocaine/Phenylephrine\par Procedure: Flexible Laryngoscopy with Stroboscopy\par Procedure Details: \par The patient was placed in the sitting position. After decongestant and anesthesia were applied the laryngoscope was passed. The nasal cavities, nasopharynx, oropharynx, hypopharynx, and larynx were all examined. Vocal folds were examined during respiration and phonation. The following findings were noted:\par  \par Findings: \par Nose: Septum is midline, turbinates are normal, nasal airways patent, mucosa normal\par Nasopharynx: Adenoids normal, no masses, eustachian tube normal\par Oropharynx: Pharyngeal walls symmetric and without lesion. Tonsils/fossae symmetric\par Hypopharynx: Hypopharynx and pyriform sinuses without lesion. No masses or asymmetry. No pooling of secretions.\par Larynx: Epiglottis and aryepiglottic folds were sharp and crisp bilaterally. Bilateral false vocal folds normal appearance. Airway was widely patent.\par  \par Strobe Exam Ratings\par TVF Appearance: normal appearing, no bowing\par TVF Mobility: normal\par Edema/hypertrophy: none\par Mucus on TVF: scant\par Glottic Closure: no glottic gap\par Mucosal Wave: normal\par Amplitude of Vibration: normal\par Phase: symmetric\par Supraglottic Hyperfunction: improved supraglottic compression\par Other Findings: none\par  \par Condition: Stable. Patient tolerated procedure well.\par  \par Complications: None\par

## 2021-01-08 NOTE — ASSESSMENT
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  Despite a good physical exam and a perceptibly normal voice the patient remains unsatisfied.  The patient explains that he is interested in thyroplasty type 3b and is following with Dr. Bledsoe regarding this.  I reviewed the risks and benefits of a permanent procedure like this and still recommended voice therapy and consultation with Maritza Lopez, JANNY.  We briefly discussed referral for a therapist as well as he is fixated on a "high pitched" voice despite being in a normal male range.  At this point he can follow up with me as needed.\par \par Plan:\par - voice hygiene\par - increased hydration\par - referral to Maritza Lopez if no improvement

## 2021-01-08 NOTE — PHYSICAL EXAM
[Normal] : normal appearance, well groomed, well nourished, and in no acute distress [FreeTextEntry1] : good voice clarity and pitch control .

## 2021-06-30 ENCOUNTER — APPOINTMENT (OUTPATIENT)
Dept: OTOLARYNGOLOGY | Facility: CLINIC | Age: 26
End: 2021-06-30
Payer: COMMERCIAL

## 2021-06-30 DIAGNOSIS — J38.3 OTHER DISEASES OF VOCAL CORDS: ICD-10-CM

## 2021-06-30 DIAGNOSIS — R49.9 UNSPECIFIED VOICE AND RESONANCE DISORDER: ICD-10-CM

## 2021-06-30 DIAGNOSIS — R49.0 DYSPHONIA: ICD-10-CM

## 2021-06-30 PROCEDURE — 99024 POSTOP FOLLOW-UP VISIT: CPT

## 2021-06-30 RX ORDER — FAMOTIDINE 40 MG/1
40 TABLET, FILM COATED ORAL
Qty: 30 | Refills: 4 | Status: ACTIVE | COMMUNITY
Start: 2021-06-30 | End: 1900-01-01

## 2021-06-30 NOTE — HISTORY OF PRESENT ILLNESS
[de-identified] : JUHI ROSALES Was seen for evaluation for thyroplasty again. He did not have much response to vocal fold injections. His main complaint is that his speech gets higher when she phonates loudly or with vocal fatigue. At rest, his fundamental frequency was 124.The patient had no other ear nose or throat complaints at this visit.

## 2021-06-30 NOTE — PHYSICAL EXAM
[FreeTextEntry1] : On examination, she looked well and was in no distress and his conversational which was basically within normal limits.\par \par Flexible fiberoptic laryngoscopy: [facility] [id number]\par Indications: Dysphagia\par Procedure note:\par \par Flexible fiberoptic laryngoscopy was performed because of dysphagia and because of the patient's inability to tolerate adequate mirror examination.\par \par The nasal cavity was anesthetized with Pontocaine and Afrin.\par The flexible endoscope was placed into the patient's nasal cavity.\par The nasopharynx was without masses.\par The oropharynx, vallecula and base of tongue had no masses.\par The epiglottis, aryepiglottic folds and false vocal cords were normal.\par The pyriform sinuses were without mucosal lesions or pooling of secretions.  \par The laryngeal ventricles were without lesions.\par The visualized subglottis was without mass.\par The lateral and posterior pharyngeal walls were clear and symmetrical.\par The vocal folds were clear and mobile; they abducted and adducted normally.\par There was no interarytenoid mass or fullness.\par \par Endoscopy was done with Covid precautions and with video. 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

## 2021-06-30 NOTE — ASSESSMENT
[FreeTextEntry1] : I explained that thyroplasty 3B would lower his speech frequencies, which basically are normal now and would be unlikely to help his main complaint.\par He has posterior laryngeal inflammation which can be part of the reason that he has a gap on high pitches and gets vocal fatigue and I discussed this with him. I recommended a course of famotidine 40 mg at bedtime.  I suggested a repeat evaluation in 4-6 weeks to make sure that the patient is improving. I ssuggested an antireflux diet.   If not better I would recommend further evaluation including possibly pH testing, PPI therapy and/or further GI evaluation.\par I explained, however that I have not been seen him on the general medical care level and would be happy to have him registered to be treated so in the future if he would like to follow with me for this.

## 2021-07-22 ENCOUNTER — APPOINTMENT (OUTPATIENT)
Dept: OTOLARYNGOLOGY | Facility: CLINIC | Age: 26
End: 2021-07-22

## 2021-08-18 ENCOUNTER — APPOINTMENT (OUTPATIENT)
Dept: OTOLARYNGOLOGY | Facility: CLINIC | Age: 26
End: 2021-08-18

## 2025-01-15 ENCOUNTER — APPOINTMENT (OUTPATIENT)
Dept: OTOLARYNGOLOGY | Facility: CLINIC | Age: 30
End: 2025-01-15